# Patient Record
Sex: MALE | Race: WHITE | NOT HISPANIC OR LATINO | Employment: FULL TIME | URBAN - METROPOLITAN AREA
[De-identification: names, ages, dates, MRNs, and addresses within clinical notes are randomized per-mention and may not be internally consistent; named-entity substitution may affect disease eponyms.]

---

## 2017-03-30 ENCOUNTER — OFFICE VISIT (OUTPATIENT)
Dept: CARDIOLOGY | Facility: CLINIC | Age: 42
End: 2017-03-30

## 2017-03-30 VITALS
SYSTOLIC BLOOD PRESSURE: 202 MMHG | WEIGHT: 315 LBS | HEART RATE: 77 BPM | DIASTOLIC BLOOD PRESSURE: 110 MMHG | HEIGHT: 67 IN | BODY MASS INDEX: 49.44 KG/M2

## 2017-03-30 DIAGNOSIS — R60.0 BILATERAL EDEMA OF LOWER EXTREMITY: ICD-10-CM

## 2017-03-30 DIAGNOSIS — E66.01 MORBID OBESITY DUE TO EXCESS CALORIES (HCC): Primary | ICD-10-CM

## 2017-03-30 DIAGNOSIS — I10 ESSENTIAL HYPERTENSION: ICD-10-CM

## 2017-03-30 PROCEDURE — 99214 OFFICE O/P EST MOD 30 MIN: CPT | Performed by: INTERNAL MEDICINE

## 2017-03-30 PROCEDURE — 93000 ELECTROCARDIOGRAM COMPLETE: CPT | Performed by: INTERNAL MEDICINE

## 2017-03-30 RX ORDER — CHLORTHALIDONE 25 MG/1
25 TABLET ORAL DAILY
Qty: 30 TABLET | Refills: 5 | Status: SHIPPED | OUTPATIENT
Start: 2017-03-30 | End: 2017-04-14

## 2017-03-30 RX ORDER — CARVEDILOL 6.25 MG/1
6.25 TABLET ORAL 2 TIMES DAILY
Qty: 60 TABLET | Refills: 6 | Status: SHIPPED | OUTPATIENT
Start: 2017-03-30 | End: 2017-10-29 | Stop reason: SDUPTHER

## 2017-03-30 RX ORDER — VARENICLINE TARTRATE 1 MG/1
1 TABLET, FILM COATED ORAL 2 TIMES DAILY
Qty: 60 TABLET | Refills: 6 | Status: SHIPPED | OUTPATIENT
Start: 2017-03-30 | End: 2017-05-03 | Stop reason: SDUPTHER

## 2017-03-30 RX ORDER — VARENICLINE TARTRATE 0.5 MG/1
TABLET, FILM COATED ORAL
Qty: 11 TABLET | Refills: 0 | Status: SHIPPED | OUTPATIENT
Start: 2017-03-30 | End: 2019-08-24

## 2017-03-30 NOTE — PROGRESS NOTES
Sigifredo Lynn  1975  Date of Office Visit: 03/30/2017  Encounter Provider: Kyle Lux MD  Place of Service: [facility]      CHIEF COMPLAINT:   1. Morbid obesity.    2. Essential hypertension.   3. Lower extremity edema.   4. Tobacco abuse.         HISTORY OF PRESENT ILLNESS: I had the pleasure of seeing your patient back in followup.  As you well know, he is an obese, noncompliant patient with a medical history of essential hypertension, lower extremity edema, tobacco abuse, hyperlipidemia and sleep apnea.  He presents back for followup and has been noncompliant once again with his medications.  He is not on any antihypertensive therapies.  His blood pressure has been significantly elevated and today it is 202/110.  He reports headaches almost weekly along with visual changes at the same frequency, typically lasting about an hour to 2 hours in duration.  He feels that way when he has an extremely increased blood pressure.  He denies any chest pain or significant dyspnea; however, his activity level is very low.  Previously, he has undergone a transthoracic echocardiogram.  This was performed in 01/2015.  At that time his ejection fraction was normal and he had normal diastolic function.          Review of Systems   Constitution: Positive for malaise/fatigue. Negative for fever and weakness.   HENT: Negative for nosebleeds and sore throat.    Eyes: Negative for blurred vision and double vision.   Cardiovascular: Positive for leg swelling. Negative for chest pain, claudication, palpitations and syncope.   Respiratory: Positive for shortness of breath. Negative for cough and snoring.    Endocrine: Negative for cold intolerance, heat intolerance and polydipsia.   Skin: Negative for itching, poor wound healing and rash.   Musculoskeletal: Negative for joint pain, joint swelling, muscle weakness and myalgias.   Gastrointestinal: Negative for abdominal pain,  "melena, nausea and vomiting.   Neurological: Negative for light-headedness, loss of balance, seizures and vertigo.   Psychiatric/Behavioral: Negative for altered mental status and depression.         Past Medical History:   Diagnosis Date   • Allergy    • Dyspnea    • Hypercholesteremia    • Hypertension    • Sleep apnea    • Snoring    • TIA (transient ischemic attack)    • Urinary frequency        The following portions of the patient's history were reviewed and updated as appropriate: Social history , Family history and Surgical history     Current Outpatient Prescriptions on File Prior to Visit   Medication Sig Dispense Refill   • albuterol (PROVENTIL HFA) 108 (90 BASE) MCG/ACT inhaler Inhale 2 puffs every 4 (four) hours as needed for wheezing or shortness of air. 1 inhaler 0   • [DISCONTINUED] CARVEDILOL PO Take 6.25 mg by mouth 2 (Two) Times a Day.       No current facility-administered medications on file prior to visit.        No Known Allergies    Vitals:    03/30/17 1349   BP: (!) 202/110   Pulse: 77   Weight: (!) 415 lb (188 kg)   Height: 67\" (170.2 cm)     Physical Exam   Constitutional: He is oriented to person, place, and time. He appears well-developed and well-nourished.   Morbidly obese     HENT:   Head: Normocephalic and atraumatic.   Eyes: Conjunctivae and EOM are normal. No scleral icterus.   Neck: Normal range of motion. Neck supple. Normal carotid pulses, no hepatojugular reflux and no JVD present. Carotid bruit is not present. No tracheal deviation present. No thyromegaly present.   Cardiovascular: Normal rate and regular rhythm.  Exam reveals no gallop and no friction rub.    No murmur heard.  Pulses:       Carotid pulses are 2+ on the right side, and 2+ on the left side.       Radial pulses are 2+ on the right side, and 2+ on the left side.        Femoral pulses are 2+ on the right side, and 2+ on the left side.       Dorsalis pedis pulses are 2+ on the right side, and 2+ on the left side. "        Posterior tibial pulses are 2+ on the right side, and 2+ on the left side.   Pulmonary/Chest: Breath sounds normal. No respiratory distress. He has no decreased breath sounds. He has no wheezes. He has no rhonchi. He has no rales. He exhibits no tenderness.   Abdominal: Soft. Bowel sounds are normal. He exhibits no distension. There is no tenderness. There is no rebound.   Musculoskeletal: He exhibits edema (trace bilateral LE). He exhibits no deformity.   Neurological: He is alert and oriented to person, place, and time. He has normal strength. No sensory deficit.   Skin: No rash noted. No erythema.   Psychiatric: He has a normal mood and affect. His behavior is normal.           No components found for: CBC  No results found for: CMP  No components found for: LIPID  No results found for: BMP      ECG 12 Lead  Date/Time: 3/30/2017 2:24 PM  Performed by: CELESTINA DEL ROSARIO  Authorized by: CELESTINA DEL ROSARIO   Comparison: compared with previous ECG from 3/20/2015  Similar to previous ECG  Rhythm: sinus rhythm  Rate: normal  ST Segments: ST segments normal  T Waves: T waves normal  QRS axis: normal  Clinical impression: normal ECG               DISCUSSION/SUMMARY He is a very pleasant, yet noncompliant and obese gentleman with a medical history of essential hypertension that is poorly controlled, lower extremity edema, obstructive sleep apnea who presents back for followup.  Since our last visit, he has quit taking his antihypertensive therapy and his blood pressure today is significantly elevated with a systolic greater than 200 mmHg.  In addition to that, he has had headache along with visual changes that would be concerning for malignant hypertension.      At this time I think he is playing with fire.  He is in a dangerous situation where he is just a real setup for stroke.  I have encouraged multiple things including medication compliance along with followup and alteration in his diet.       1. Essential hypertension.  Restart carvedilol at 6.25 mg twice a day.  His heart rate is only in the low 70s and I do not think that he will tolerate much higher dose of that.  Add chlorthalidone starting at 25 mg daily.  He will likely need additional increases in that dosage.  His wife will check has blood pressure in the next couple of weeks and give us a call back to let us know where it is.  We will make changes based on that.    2. Tobacco abuse.  I have also written a prescription for Chantix to help him to stop smoking.    3. Morbid obesity.      He needs a primary care provider and I have given him a list of names.

## 2017-04-14 ENCOUNTER — TELEPHONE (OUTPATIENT)
Dept: CARDIOLOGY | Facility: CLINIC | Age: 42
End: 2017-04-14

## 2017-04-14 DIAGNOSIS — I10 ESSENTIAL HYPERTENSION: Primary | ICD-10-CM

## 2017-04-14 RX ORDER — LISINOPRIL 5 MG/1
5 TABLET ORAL DAILY
Qty: 30 TABLET | Refills: 11 | Status: SHIPPED | OUTPATIENT
Start: 2017-04-14 | End: 2017-04-19 | Stop reason: SINTOL

## 2017-04-14 NOTE — TELEPHONE ENCOUNTER
Pt called to let us know he was unable to take the chlorthalidone we started him on. In the first week he took it he was getting very dizzy. To the point he couldn't drive. He stopped it and his symptoms went away. But now his BP is 170/100. Is there anything he can take instead? Please advise.  Thanks,  Dahlia

## 2017-04-18 NOTE — TELEPHONE ENCOUNTER
Pt's wife told me that the lisinopril wasn't working either. He was getting dizzy and nauseated on the lisinopril. He also mentioned having mild hallucinations but she thought that might be the Chantix. However, they stopped when he skipped the lisinopril. His BP was 170/90 on the medication. What next an ARB?    Dahlia

## 2017-04-19 RX ORDER — LOSARTAN POTASSIUM 25 MG/1
25 TABLET ORAL DAILY
Qty: 30 TABLET | Refills: 1 | Status: SHIPPED | OUTPATIENT
Start: 2017-04-19 | End: 2017-06-19 | Stop reason: SDUPTHER

## 2017-05-03 RX ORDER — VARENICLINE TARTRATE 1 MG/1
1 TABLET, FILM COATED ORAL 2 TIMES DAILY
Qty: 60 TABLET | Refills: 6 | Status: SHIPPED | OUTPATIENT
Start: 2017-05-03 | End: 2017-06-16 | Stop reason: SDUPTHER

## 2017-06-16 RX ORDER — VARENICLINE TARTRATE 1 MG/1
1 TABLET, FILM COATED ORAL 2 TIMES DAILY
Qty: 60 TABLET | Refills: 6 | Status: SHIPPED | OUTPATIENT
Start: 2017-06-16 | End: 2019-08-24

## 2017-06-19 RX ORDER — LOSARTAN POTASSIUM 25 MG/1
TABLET ORAL
Qty: 30 TABLET | Refills: 5 | Status: SHIPPED | OUTPATIENT
Start: 2017-06-19 | End: 2017-09-11

## 2017-09-11 ENCOUNTER — OFFICE VISIT (OUTPATIENT)
Dept: RETAIL CLINIC | Facility: CLINIC | Age: 42
End: 2017-09-11

## 2017-09-11 VITALS
SYSTOLIC BLOOD PRESSURE: 182 MMHG | RESPIRATION RATE: 16 BRPM | DIASTOLIC BLOOD PRESSURE: 100 MMHG | OXYGEN SATURATION: 96 % | HEART RATE: 81 BPM | TEMPERATURE: 97 F

## 2017-09-11 DIAGNOSIS — J06.9 ACUTE URI: Primary | ICD-10-CM

## 2017-09-11 PROCEDURE — 99213 OFFICE O/P EST LOW 20 MIN: CPT | Performed by: NURSE PRACTITIONER

## 2017-09-11 RX ORDER — ALBUTEROL SULFATE 90 UG/1
2 AEROSOL, METERED RESPIRATORY (INHALATION) EVERY 4 HOURS PRN
Qty: 1 INHALER | Refills: 0 | Status: SHIPPED | OUTPATIENT
Start: 2017-09-11 | End: 2017-10-11

## 2017-09-11 RX ORDER — GUAIFENESIN 600 MG/1
1200 TABLET, EXTENDED RELEASE ORAL 2 TIMES DAILY
Qty: 20 TABLET | Refills: 0 | Status: SHIPPED | OUTPATIENT
Start: 2017-09-11 | End: 2017-09-16

## 2017-09-11 RX ORDER — BENZONATATE 200 MG/1
200 CAPSULE ORAL 3 TIMES DAILY PRN
Qty: 15 CAPSULE | Refills: 0 | Status: SHIPPED | OUTPATIENT
Start: 2017-09-11 | End: 2017-09-16

## 2017-09-11 NOTE — PATIENT INSTRUCTIONS
"Upper Respiratory Infection, Adult  Most upper respiratory infections (URIs) are a viral infection of the air passages leading to the lungs. A URI affects the nose, throat, and upper air passages. The most common type of URI is nasopharyngitis and is typically referred to as \"the common cold.\"  URIs run their course and usually go away on their own. Most of the time, a URI does not require medical attention, but sometimes a bacterial infection in the upper airways can follow a viral infection. This is called a secondary infection. Sinus and middle ear infections are common types of secondary upper respiratory infections.  Bacterial pneumonia can also complicate a URI. A URI can worsen asthma and chronic obstructive pulmonary disease (COPD). Sometimes, these complications can require emergency medical care and may be life threatening.   CAUSES  Almost all URIs are caused by viruses. A virus is a type of germ and can spread from one person to another.   RISKS FACTORS  You may be at risk for a URI if:   · You smoke.    · You have chronic heart or lung disease.  · You have a weakened defense (immune) system.    · You are very young or very old.    · You have nasal allergies or asthma.  · You work in crowded or poorly ventilated areas.  · You work in health care facilities or schools.  SIGNS AND SYMPTOMS   Symptoms typically develop 2-3 days after you come in contact with a cold virus. Most viral URIs last 7-10 days. However, viral URIs from the influenza virus (flu virus) can last 14-18 days and are typically more severe. Symptoms may include:   · Runny or stuffy (congested) nose.    · Sneezing.    · Cough.    · Sore throat.    · Headache.    · Fatigue.    · Fever.    · Loss of appetite.    · Pain in your forehead, behind your eyes, and over your cheekbones (sinus pain).  · Muscle aches.    DIAGNOSIS   Your health care provider may diagnose a URI by:  · Physical exam.  · Tests to check that your symptoms are not due to " another condition such as:  ¨ Strep throat.  ¨ Sinusitis.  ¨ Pneumonia.  ¨ Asthma.  TREATMENT   A URI goes away on its own with time. It cannot be cured with medicines, but medicines may be prescribed or recommended to relieve symptoms. Medicines may help:  · Reduce your fever.  · Reduce your cough.  · Relieve nasal congestion.  HOME CARE INSTRUCTIONS   · Take medicines only as directed by your health care provider.    · Gargle warm saltwater or take cough drops to comfort your throat as directed by your health care provider.  · Use a warm mist humidifier or inhale steam from a shower to increase air moisture. This may make it easier to breathe.  · Drink enough fluid to keep your urine clear or pale yellow.    · Eat soups and other clear broths and maintain good nutrition.    · Rest as needed.    · Return to work when your temperature has returned to normal or as your health care provider advises. You may need to stay home longer to avoid infecting others. You can also use a face mask and careful hand washing to prevent spread of the virus.  · Increase the usage of your inhaler if you have asthma.    · Do not use any tobacco products, including cigarettes, chewing tobacco, or electronic cigarettes. If you need help quitting, ask your health care provider.  PREVENTION   The best way to protect yourself from getting a cold is to practice good hygiene.   · Avoid oral or hand contact with people with cold symptoms.    · Wash your hands often if contact occurs.    There is no clear evidence that vitamin C, vitamin E, echinacea, or exercise reduces the chance of developing a cold. However, it is always recommended to get plenty of rest, exercise, and practice good nutrition.   SEEK MEDICAL CARE IF:   · You are getting worse rather than better.    · Your symptoms are not controlled by medicine.    · You have chills.  · You have worsening shortness of breath.  · You have brown or red mucus.  · You have yellow or brown nasal  discharge.  · You have pain in your face, especially when you bend forward.  · You have a fever.  · You have swollen neck glands.  · You have pain while swallowing.  · You have white areas in the back of your throat.  SEEK IMMEDIATE MEDICAL CARE IF:   · You have severe or persistent:    Headache.    Ear pain.    Sinus pain.    Chest pain.  · You have chronic lung disease and any of the following:    Wheezing.    Prolonged cough.    Coughing up blood.    A change in your usual mucus.  · You have a stiff neck.  · You have changes in your:    Vision.    Hearing.    Thinking.    Mood.  MAKE SURE YOU:   · Understand these instructions.  · Will watch your condition.  · Will get help right away if you are not doing well or get worse.     This information is not intended to replace advice given to you by your health care provider. Make sure you discuss any questions you have with your health care provider.     Document Released: 06/13/2002 Document Revised: 05/03/2016 Document Reviewed: 03/25/2015  [employer] Interactive Patient Education ©2017 [employer]

## 2017-09-11 NOTE — PROGRESS NOTES
Subjective   Patient ID: Sigifredo Lynn is a 42 y.o. male presents with   Chief Complaint   Patient presents with   • Bronchitis     today       Bronchitis   This is a new problem. The current episode started today. The problem has been gradually worsening. Associated symptoms include congestion, coughing, diaphoresis and fatigue. Pertinent negatives include no chills, fever, headaches or sore throat. Nothing aggravates the symptoms. He has tried nothing for the symptoms. The treatment provided no relief.   coughing hourly worse at night    No Known Allergies    The following portions of the patient's history were reviewed and updated as appropriate: allergies, current medications, past family history, past medical history, past social history, past surgical history and problem list.      Review of Systems   Constitutional: Positive for diaphoresis and fatigue. Negative for chills and fever.   HENT: Positive for congestion and rhinorrhea. Negative for ear pain, postnasal drip, sinus pressure and sore throat.    Eyes: Negative.    Respiratory: Positive for cough, shortness of breath and wheezing. Negative for chest tightness.    Cardiovascular: Negative.    Gastrointestinal: Negative.    Neurological: Negative for headaches.       Objective     Vitals:    09/11/17 1414   BP: (!) 182/100   Pulse: 81   Resp: 16   Temp: 97 °F (36.1 °C)   SpO2: 96%         Physical Exam   Constitutional: He is oriented to person, place, and time. He appears well-developed and well-nourished. He does not appear ill. No distress.   HENT:   Head: Normocephalic.   Right Ear: Hearing, tympanic membrane, external ear and ear canal normal.   Left Ear: Hearing, tympanic membrane, external ear and ear canal normal.   Nose: Mucosal edema present. No rhinorrhea or sinus tenderness.   Mouth/Throat: Mucous membranes are normal. Posterior oropharyngeal erythema present. Tonsils are 2+ on the right. Tonsils are 2+ on the left. No tonsillar exudate.    Eyes: Conjunctivae are normal.   Sclera white.   Neck: No tracheal deviation present.   Cardiovascular: Normal rate, regular rhythm, S1 normal, S2 normal and normal heart sounds.    Pulmonary/Chest: Effort normal. No accessory muscle usage. No respiratory distress. He has decreased breath sounds in the right middle field, the right lower field, the left middle field and the left lower field. He has no wheezes. He has no rhonchi. He has no rales.   Abdominal: Soft. Bowel sounds are normal. There is no tenderness.   Lymphadenopathy:     He has no cervical adenopathy.   Neurological: He is alert and oriented to person, place, and time.   Skin: Skin is warm and dry.   Vitals reviewed.        Sigifredo was seen today for bronchitis.    Diagnoses and all orders for this visit:    Acute URI  -     albuterol (PROVENTIL HFA) 108 (90 Base) MCG/ACT inhaler; Inhale 2 puffs Every 4 (Four) Hours As Needed for Wheezing or Shortness of Air for up to 30 days.  -     benzonatate (TESSALON) 200 MG capsule; Take 1 capsule by mouth 3 (Three) Times a Day As Needed for Cough for up to 5 days.  -     guaiFENesin (MUCINEX) 600 MG 12 hr tablet; Take 2 tablets by mouth 2 (Two) Times a Day for 5 days.        Referred to ER for high blood pressure. Follow-up with Primary Care Physician in 48-72 hours if these symptoms worsen or fail to improve as anticipated. Patient verbalizes understanding.

## 2017-10-30 RX ORDER — CARVEDILOL 6.25 MG/1
TABLET ORAL
Qty: 60 TABLET | Refills: 5 | Status: SHIPPED | OUTPATIENT
Start: 2017-10-30 | End: 2019-08-24

## 2019-04-04 ENCOUNTER — OFFICE VISIT (OUTPATIENT)
Dept: RETAIL CLINIC | Facility: CLINIC | Age: 44
End: 2019-04-04

## 2019-04-04 VITALS
HEART RATE: 81 BPM | SYSTOLIC BLOOD PRESSURE: 150 MMHG | OXYGEN SATURATION: 96 % | RESPIRATION RATE: 16 BRPM | TEMPERATURE: 97.8 F | DIASTOLIC BLOOD PRESSURE: 90 MMHG

## 2019-04-04 DIAGNOSIS — J40 BRONCHITIS: Primary | ICD-10-CM

## 2019-04-04 DIAGNOSIS — J98.8 VIRAL RESPIRATORY ILLNESS: ICD-10-CM

## 2019-04-04 DIAGNOSIS — B97.89 VIRAL RESPIRATORY ILLNESS: ICD-10-CM

## 2019-04-04 LAB
EXPIRATION DATE: NORMAL
FLUAV AG NPH QL: NEGATIVE
FLUBV AG NPH QL: NEGATIVE
INTERNAL CONTROL: NORMAL
Lab: NORMAL

## 2019-04-04 PROCEDURE — 87804 INFLUENZA ASSAY W/OPTIC: CPT | Performed by: NURSE PRACTITIONER

## 2019-04-04 PROCEDURE — 99213 OFFICE O/P EST LOW 20 MIN: CPT | Performed by: NURSE PRACTITIONER

## 2019-04-04 RX ORDER — GUAIFENESIN 1200 MG/1
1 TABLET, EXTENDED RELEASE ORAL 2 TIMES DAILY
Qty: 10 EACH | Refills: 0 | Status: SHIPPED | OUTPATIENT
Start: 2019-04-04 | End: 2019-04-09

## 2019-04-04 RX ORDER — CETIRIZINE HYDROCHLORIDE 10 MG/1
10 TABLET ORAL DAILY
Qty: 14 TABLET | Refills: 0 | Status: SHIPPED | OUTPATIENT
Start: 2019-04-04 | End: 2019-04-18

## 2019-04-04 RX ORDER — ALBUTEROL SULFATE 90 UG/1
2 AEROSOL, METERED RESPIRATORY (INHALATION) EVERY 4 HOURS PRN
Qty: 1 INHALER | Refills: 0 | Status: SHIPPED | OUTPATIENT
Start: 2019-04-04 | End: 2019-05-04

## 2019-04-04 RX ORDER — FLUTICASONE PROPIONATE 50 MCG
1 SPRAY, SUSPENSION (ML) NASAL EVERY 12 HOURS
Qty: 1 BOTTLE | Refills: 0 | Status: SHIPPED | OUTPATIENT
Start: 2019-04-04 | End: 2019-04-11

## 2019-04-04 RX ORDER — BENZONATATE 200 MG/1
200 CAPSULE ORAL 3 TIMES DAILY PRN
Qty: 15 CAPSULE | Refills: 0 | Status: SHIPPED | OUTPATIENT
Start: 2019-04-04 | End: 2019-04-09

## 2019-04-04 NOTE — PATIENT INSTRUCTIONS
Acute Bronchitis, Adult  Acute bronchitis is sudden (acute) swelling of the air tubes (bronchi) in the lungs. Acute bronchitis causes these tubes to fill with mucus, which can make it hard to breathe. It can also cause coughing or wheezing.  In adults, acute bronchitis usually goes away within 2 weeks. A cough caused by bronchitis may last up to 3 weeks. Smoking, allergies, and asthma can make the condition worse. Repeated episodes of bronchitis may cause further lung problems, such as chronic obstructive pulmonary disease (COPD).  What are the causes?  This condition can be caused by germs and by substances that irritate the lungs, including:  · Cold and flu viruses. This condition is most often caused by the same virus that causes a cold.  · Bacteria.  · Exposure to tobacco smoke, dust, fumes, and air pollution.    What increases the risk?  This condition is more likely to develop in people who:  · Have close contact with someone with acute bronchitis.  · Are exposed to lung irritants, such as tobacco smoke, dust, fumes, and vapors.  · Have a weak immune system.  · Have a respiratory condition such as asthma.    What are the signs or symptoms?  Symptoms of this condition include:  · A cough.  · Coughing up clear, yellow, or green mucus.  · Wheezing.  · Chest congestion.  · Shortness of breath.  · A fever.  · Body aches.  · Chills.  · A sore throat.    How is this diagnosed?  This condition is usually diagnosed with a physical exam. During the exam, your health care provider may order tests, such as chest X-rays, to rule out other conditions. He or she may also:  · Test a sample of your mucus for bacterial infection.  · Check the level of oxygen in your blood. This is done to check for pneumonia.  · Do a chest X-ray or lung function testing to rule out pneumonia and other conditions.  · Perform blood tests.    Your health care provider will also ask about your symptoms and medical history.  How is this  treated?  Most cases of acute bronchitis clear up over time without treatment. Your health care provider may recommend:  · Drinking more fluids. Drinking more makes your mucus thinner, which may make it easier to breathe.  · Taking a medicine for a fever or cough.  · Taking an antibiotic medicine.  · Using an inhaler to help improve shortness of breath and to control a cough.  · Using a cool mist vaporizer or humidifier to make it easier to breathe.    Follow these instructions at home:  Medicines  · Take over-the-counter and prescription medicines only as told by your health care provider.  · If you were prescribed an antibiotic, take it as told by your health care provider. Do not stop taking the antibiotic even if you start to feel better.  General instructions  · Get plenty of rest.  · Drink enough fluids to keep your urine pale yellow.  · Avoid smoking and secondhand smoke. Exposure to cigarette smoke or irritating chemicals will make bronchitis worse. If you smoke and you need help quitting, ask your health care provider. Quitting smoking will help your lungs heal faster.  · Use an inhaler, cool mist vaporizer, or humidifier as told by your health care provider.  · Keep all follow-up visits as told by your health care provider. This is important.  How is this prevented?  To lower your risk of getting this condition again:  · Wash your hands often with soap and water. If soap and water are not available, use hand .  · Avoid contact with people who have cold symptoms.  · Try not to touch your hands to your mouth, nose, or eyes.  · Make sure to get the flu shot every year.    Contact a health care provider if:  · Your symptoms do not improve in 2 weeks of treatment.  Get help right away if:  · You cough up blood.  · You have chest pain.  · You have severe shortness of breath.  · You become dehydrated.  · You faint or keep feeling like you are going to faint.  · You keep vomiting.  · You have a severe  headache.  · Your fever or chills gets worse.  This information is not intended to replace advice given to you by your health care provider. Make sure you discuss any questions you have with your health care provider.  Document Released: 01/25/2006 Document Revised: 08/01/2018 Document Reviewed: 06/07/2017  [employer] Interactive Patient Education © 2019 [employer]  Viral Respiratory Infection  A respiratory infection is an illness that affects part of the respiratory system, such as the lungs, nose, or throat. Most respiratory infections are caused by either viruses or bacteria. A respiratory infection that is caused by a virus is called a viral respiratory infection.  Common types of viral respiratory infections include:  · A cold.  · The flu (influenza).  · A respiratory syncytial virus (RSV) infection.    How do I know if I have a viral respiratory infection?  Most viral respiratory infections cause:  · A stuffy or runny nose.  · Yellow or green nasal discharge.  · A cough.  · Sneezing.  · Fatigue.  · Achy muscles.  · A sore throat.  · Sweating or chills.  · A fever.  · A headache.    How are viral respiratory infections treated?  If influenza is diagnosed early, it may be treated with an antiviral medicine that shortens the length of time a person has symptoms. Symptoms of viral respiratory infections may be treated with over-the-counter and prescription medicines, such as:  · Expectorants. These make it easier to cough up mucus.  · Decongestant nasal sprays.    Health care providers do not prescribe antibiotic medicines for viral infections. This is because antibiotics are designed to kill bacteria. They have no effect on viruses.  How do I know if I should stay home from work or school?  To avoid exposing others to your respiratory infection, stay home if you have:  · A fever.  · A persistent cough.  · A sore throat.  · A runny nose.  · Sneezing.  · Muscles  aches.  · Headaches.  · Fatigue.  · Weakness.  · Chills.  · Sweating.  · Nausea.    Follow these instructions at home:  · Rest as much as possible.  · Take over-the-counter and prescription medicines only as told by your health care provider.  · Drink enough fluid to keep your urine clear or pale yellow. This helps prevent dehydration and helps loosen up mucus.  · Gargle with a salt-water mixture 3-4 times per day or as needed. To make a salt-water mixture, completely dissolve ½-1 tsp of salt in 1 cup of warm water.  · Use nose drops made from salt water to ease congestion and soften raw skin around your nose.  · Do not drink alcohol.  · Do not use tobacco products, including cigarettes, chewing tobacco, and e-cigarettes. If you need help quitting, ask your health care provider.  Contact a health care provider if:  · Your symptoms last for 10 days or longer.  · Your symptoms get worse over time.  · You have a fever.  · You have severe sinus pain in your face or forehead.  · The glands in your jaw or neck become very swollen.  Get help right away if:  · You feel pain or pressure in your chest.  · You have shortness of breath.  · You faint or feel like you will faint.  · You have severe and persistent vomiting.  · You feel confused or disoriented.  This information is not intended to replace advice given to you by your health care provider. Make sure you discuss any questions you have with your health care provider.  Document Released: 09/27/2006 Document Revised: 05/25/2017 Document Reviewed: 05/25/2016  [employer] Interactive Patient Education © 2019 [employer]

## 2019-04-04 NOTE — PROGRESS NOTES
Subjective   Patient ID: Sigifredo Lynn is a 43 y.o. male presents with   Chief Complaint   Patient presents with   • Flu Symptoms     Pt states his symptom started two days ago. He states it got worse yesterday. He states he is drained and has body aches and chills. He states he has a headache. He states he's had a runny and stuffy nose. He state he has congestion.        Flu Symptoms   This is a new problem. The current episode started in the past 7 days. The problem has been gradually worsening. Associated symptoms include chills, congestion, coughing (dry x 4d), diaphoresis, fatigue, a fever (low grade), headaches and nausea. Pertinent negatives include no sore throat or vomiting. Nothing aggravates the symptoms. He has tried acetaminophen (nyquil) for the symptoms. The treatment provided mild relief.       No Known Allergies    The following portions of the patient's history were reviewed and updated as appropriate: allergies, current medications, past family history, past medical history, past social history, past surgical history and problem list.      Review of Systems   Constitutional: Positive for chills, diaphoresis, fatigue and fever (low grade).   HENT: Positive for congestion, postnasal drip, rhinorrhea, sinus pressure and sneezing. Negative for ear pain and sore throat.    Respiratory: Positive for cough (dry x 4d), chest tightness and wheezing. Negative for shortness of breath.    Cardiovascular: Negative.    Gastrointestinal: Positive for nausea. Negative for diarrhea and vomiting.   Musculoskeletal:        Bodyaches   Neurological: Positive for headaches.       Objective     Vitals:    04/04/19 1016   BP: 150/90   Pulse: 81   Resp: 16   Temp: 97.8 °F (36.6 °C)   SpO2: 96%         Physical Exam   Constitutional: He is oriented to person, place, and time. He appears well-developed and well-nourished. He does not appear ill. No distress.   HENT:   Head: Normocephalic.   Right Ear: Hearing, tympanic  membrane, external ear and ear canal normal.   Left Ear: Hearing, external ear and ear canal normal.   Nose: Mucosal edema, rhinorrhea and sinus tenderness present. Right sinus exhibits maxillary sinus tenderness and frontal sinus tenderness. Left sinus exhibits maxillary sinus tenderness and frontal sinus tenderness.   Mouth/Throat: Mucous membranes are normal. Posterior oropharyngeal erythema present. No tonsillar exudate.   Left tm not visualized due to cerumen   Eyes: Conjunctivae are normal.   Sclera white.   Neck: No tracheal deviation present.   Cardiovascular: Normal rate, regular rhythm, S1 normal, S2 normal and normal heart sounds.   Pulmonary/Chest: Effort normal. No accessory muscle usage. No respiratory distress. He has no decreased breath sounds. He has wheezes (clears a lot after cough) in the right upper field, the right middle field, the right lower field, the left upper field, the left middle field and the left lower field. He has no rhonchi. He has no rales.   Abdominal: Soft. Bowel sounds are normal. There is no tenderness.   Lymphadenopathy:     He has no cervical adenopathy.   Neurological: He is alert and oriented to person, place, and time.   Skin: Skin is warm and dry.   Vitals reviewed.    Lab Results   Component Value Date    RAPFLUA Negative 04/04/2019    RAPFLUB Negative 04/04/2019         Sigifredo was seen today for flu symptoms.    Diagnoses and all orders for this visit:    Bronchitis  -     POC Influenza A / B  -     albuterol sulfate  (90 Base) MCG/ACT inhaler; Inhale 2 puffs Every 4 (Four) Hours As Needed for Wheezing or Shortness of Air for up to 30 days.  -     PredniSONE 5 MG tablet therapy pack dosepak; Take as directed on package instructions.  -     guaiFENesin ER (MUCINEX MAXIMUM STRENGTH) 1200 MG tablet sustained-release 12 hour; Take 1 tablet by mouth 2 (Two) Times a Day for 5 days.  -     benzonatate (TESSALON) 200 MG capsule; Take 1 capsule by mouth 3 (Three) Times  a Day As Needed for Cough for up to 5 days.    Viral respiratory illness  -     POC Influenza A / B  -     fluticasone (FLONASE) 50 MCG/ACT nasal spray; 1 spray into the nostril(s) as directed by provider Every 12 (Twelve) Hours for 7 days.  -     cetirizine (zyrTEC) 10 MG tablet; Take 1 tablet by mouth Daily for 14 days.        Patient understands possible side effects of all medications ordered. Follow-up with Primary Care Physician in 48-72 hours if these symptoms worsen or fail to improve as anticipated. Patient verbalizes understanding.    Acute Bronchitis, Adult  Acute bronchitis is sudden (acute) swelling of the air tubes (bronchi) in the lungs. Acute bronchitis causes these tubes to fill with mucus, which can make it hard to breathe. It can also cause coughing or wheezing.  In adults, acute bronchitis usually goes away within 2 weeks. A cough caused by bronchitis may last up to 3 weeks. Smoking, allergies, and asthma can make the condition worse. Repeated episodes of bronchitis may cause further lung problems, such as chronic obstructive pulmonary disease (COPD).  What are the causes?  This condition can be caused by germs and by substances that irritate the lungs, including:  · Cold and flu viruses. This condition is most often caused by the same virus that causes a cold.  · Bacteria.  · Exposure to tobacco smoke, dust, fumes, and air pollution.    What increases the risk?  This condition is more likely to develop in people who:  · Have close contact with someone with acute bronchitis.  · Are exposed to lung irritants, such as tobacco smoke, dust, fumes, and vapors.  · Have a weak immune system.  · Have a respiratory condition such as asthma.    What are the signs or symptoms?  Symptoms of this condition include:  · A cough.  · Coughing up clear, yellow, or green mucus.  · Wheezing.  · Chest congestion.  · Shortness of breath.  · A fever.  · Body aches.  · Chills.  · A sore throat.    How is this  diagnosed?  This condition is usually diagnosed with a physical exam. During the exam, your health care provider may order tests, such as chest X-rays, to rule out other conditions. He or she may also:  · Test a sample of your mucus for bacterial infection.  · Check the level of oxygen in your blood. This is done to check for pneumonia.  · Do a chest X-ray or lung function testing to rule out pneumonia and other conditions.  · Perform blood tests.    Your health care provider will also ask about your symptoms and medical history.  How is this treated?  Most cases of acute bronchitis clear up over time without treatment. Your health care provider may recommend:  · Drinking more fluids. Drinking more makes your mucus thinner, which may make it easier to breathe.  · Taking a medicine for a fever or cough.  · Taking an antibiotic medicine.  · Using an inhaler to help improve shortness of breath and to control a cough.  · Using a cool mist vaporizer or humidifier to make it easier to breathe.    Follow these instructions at home:  Medicines  · Take over-the-counter and prescription medicines only as told by your health care provider.  · If you were prescribed an antibiotic, take it as told by your health care provider. Do not stop taking the antibiotic even if you start to feel better.  General instructions  · Get plenty of rest.  · Drink enough fluids to keep your urine pale yellow.  · Avoid smoking and secondhand smoke. Exposure to cigarette smoke or irritating chemicals will make bronchitis worse. If you smoke and you need help quitting, ask your health care provider. Quitting smoking will help your lungs heal faster.  · Use an inhaler, cool mist vaporizer, or humidifier as told by your health care provider.  · Keep all follow-up visits as told by your health care provider. This is important.  How is this prevented?  To lower your risk of getting this condition again:  · Wash your hands often with soap and water. If  soap and water are not available, use hand .  · Avoid contact with people who have cold symptoms.  · Try not to touch your hands to your mouth, nose, or eyes.  · Make sure to get the flu shot every year.    Contact a health care provider if:  · Your symptoms do not improve in 2 weeks of treatment.  Get help right away if:  · You cough up blood.  · You have chest pain.  · You have severe shortness of breath.  · You become dehydrated.  · You faint or keep feeling like you are going to faint.  · You keep vomiting.  · You have a severe headache.  · Your fever or chills gets worse.  This information is not intended to replace advice given to you by your health care provider. Make sure you discuss any questions you have with your health care provider.  Document Released: 01/25/2006 Document Revised: 08/01/2018 Document Reviewed: 06/07/2017  [employer] Interactive Patient Education © 2019 [employer]  Viral Respiratory Infection  A respiratory infection is an illness that affects part of the respiratory system, such as the lungs, nose, or throat. Most respiratory infections are caused by either viruses or bacteria. A respiratory infection that is caused by a virus is called a viral respiratory infection.  Common types of viral respiratory infections include:  · A cold.  · The flu (influenza).  · A respiratory syncytial virus (RSV) infection.    How do I know if I have a viral respiratory infection?  Most viral respiratory infections cause:  · A stuffy or runny nose.  · Yellow or green nasal discharge.  · A cough.  · Sneezing.  · Fatigue.  · Achy muscles.  · A sore throat.  · Sweating or chills.  · A fever.  · A headache.    How are viral respiratory infections treated?  If influenza is diagnosed early, it may be treated with an antiviral medicine that shortens the length of time a person has symptoms. Symptoms of viral respiratory infections may be treated with over-the-counter and prescription medicines, such  as:  · Expectorants. These make it easier to cough up mucus.  · Decongestant nasal sprays.    Health care providers do not prescribe antibiotic medicines for viral infections. This is because antibiotics are designed to kill bacteria. They have no effect on viruses.  How do I know if I should stay home from work or school?  To avoid exposing others to your respiratory infection, stay home if you have:  · A fever.  · A persistent cough.  · A sore throat.  · A runny nose.  · Sneezing.  · Muscles aches.  · Headaches.  · Fatigue.  · Weakness.  · Chills.  · Sweating.  · Nausea.    Follow these instructions at home:  · Rest as much as possible.  · Take over-the-counter and prescription medicines only as told by your health care provider.  · Drink enough fluid to keep your urine clear or pale yellow. This helps prevent dehydration and helps loosen up mucus.  · Gargle with a salt-water mixture 3-4 times per day or as needed. To make a salt-water mixture, completely dissolve ½-1 tsp of salt in 1 cup of warm water.  · Use nose drops made from salt water to ease congestion and soften raw skin around your nose.  · Do not drink alcohol.  · Do not use tobacco products, including cigarettes, chewing tobacco, and e-cigarettes. If you need help quitting, ask your health care provider.  Contact a health care provider if:  · Your symptoms last for 10 days or longer.  · Your symptoms get worse over time.  · You have a fever.  · You have severe sinus pain in your face or forehead.  · The glands in your jaw or neck become very swollen.  Get help right away if:  · You feel pain or pressure in your chest.  · You have shortness of breath.  · You faint or feel like you will faint.  · You have severe and persistent vomiting.  · You feel confused or disoriented.  This information is not intended to replace advice given to you by your health care provider. Make sure you discuss any questions you have with your health care provider.  Document  Released: 09/27/2006 Document Revised: 05/25/2017 Document Reviewed: 05/25/2016  [employer] Interactive Patient Education © 2019 [employer]

## 2019-08-24 ENCOUNTER — OFFICE VISIT (OUTPATIENT)
Dept: RETAIL CLINIC | Facility: CLINIC | Age: 44
End: 2019-08-24

## 2019-08-24 VITALS
TEMPERATURE: 98 F | DIASTOLIC BLOOD PRESSURE: 104 MMHG | RESPIRATION RATE: 16 BRPM | HEART RATE: 73 BPM | OXYGEN SATURATION: 94 % | SYSTOLIC BLOOD PRESSURE: 168 MMHG

## 2019-08-24 DIAGNOSIS — J01.01 ACUTE RECURRENT MAXILLARY SINUSITIS: Primary | ICD-10-CM

## 2019-08-24 DIAGNOSIS — J20.8 ACUTE BRONCHITIS DUE TO OTHER SPECIFIED ORGANISMS: ICD-10-CM

## 2019-08-24 DIAGNOSIS — I10 ESSENTIAL HYPERTENSION: ICD-10-CM

## 2019-08-24 PROCEDURE — 99214 OFFICE O/P EST MOD 30 MIN: CPT | Performed by: NURSE PRACTITIONER

## 2019-08-24 RX ORDER — GUAIFENESIN 600 MG/1
1200 TABLET, EXTENDED RELEASE ORAL 2 TIMES DAILY
Qty: 40 TABLET | Refills: 0 | Status: SHIPPED | OUTPATIENT
Start: 2019-08-24

## 2019-08-24 RX ORDER — ALBUTEROL SULFATE 90 UG/1
2 AEROSOL, METERED RESPIRATORY (INHALATION) EVERY 4 HOURS PRN
Qty: 1 INHALER | Refills: 1 | Status: SHIPPED | OUTPATIENT
Start: 2019-08-24

## 2019-08-24 RX ORDER — AMOXICILLIN AND CLAVULANATE POTASSIUM 875; 125 MG/1; MG/1
1 TABLET, FILM COATED ORAL 2 TIMES DAILY
Qty: 20 TABLET | Refills: 0 | Status: SHIPPED | OUTPATIENT
Start: 2019-08-24 | End: 2019-09-03

## 2019-08-24 RX ORDER — PREDNISONE 10 MG/1
TABLET ORAL
Qty: 1 EACH | Refills: 0 | Status: SHIPPED | OUTPATIENT
Start: 2019-08-24

## 2019-08-24 RX ORDER — BENZONATATE 100 MG/1
200 CAPSULE ORAL 3 TIMES DAILY PRN
Qty: 42 CAPSULE | Refills: 0 | Status: SHIPPED | OUTPATIENT
Start: 2019-08-24

## 2019-08-24 NOTE — PROGRESS NOTES
"[facility]    CC:   Chief Complaint   Patient presents with   • Cough   • Wheezing   • URI     HPI   44 YOM presents c/o 1 week ago \"allergy\" type symptoms, then 3 days ago, sudden change with severe head/nasal/chest congestion/nasal mucus-copious amounts, whitish, thick/  Coughing nonproductive but +soa/wheezing/severe sinus pain ears/eyes/face  Sensation of fever,+ chills  Lethargic, much decreased appetite, drinking water  Sore throat previously, now gone  +nausea/vomiting/diarrhea  X 2 days earlier this week  +chest/rib soreness with coughing    H/O bronchitis, feels same  No hospitalization for this  +kids sick back at school recently, similar symptoms but theirs is gone, his is now worse  Taking tylenol/nyquil/dayquil, no relief    Unable to smoke his usual amount.  Denies ear pain/dizziness/lightheadedness.    Review of Systems: Please see the above history of present illness for pertinent positives and negatives. The remainder of the patient's systems have been reviewed and are negative.     Past Medical History:   Diagnosis Date   • Allergy    • Dyspnea    • Hypercholesteremia    • Hypertension    • Sleep apnea    • Snoring    • TIA (transient ischemic attack)    • Urinary frequency        Past Surgical History:   Procedure Laterality Date   • HERNIA REPAIR  01/1999       Social History     Tobacco Use   • Smoking status: Current Every Day Smoker     Packs/day: 1.00     Start date: 1995   • Smokeless tobacco: Never Used   • Tobacco comment: not exposed to second hand smoke   Substance Use Topics   • Alcohol use: No     Comment: caffeine use   • Drug use: No       Current Outpatient Medications:  NONE    No Known Allergies    OBJECTIVE:    BP (!) 168/104   Pulse 73   Temp 98 °F (36.7 °C) (Oral)   Resp 16   SpO2 94%     Lab Results (last 24 hours)     ** No results found for the last 24 hours. **          General Appearance:    Alert, cooperative, no distress, appears stated age, morbidly obese "   Head:    Normocephalic, without obvious abnormality, atraumatic   Eyes:    PERRL, conjunctiva/corneas clear, EOM's intact, fundi     benign, both eyes   Ears:    Small bilateral serous effusions, bilateral canals unremarkable   Nose:   Bilaterally inflamed turbinates with whitish colored mucus noted. +bilateral maxillary sinus tenderness with palpation   Throat:   Lips, mucosa, and tongue normal; teeth and gums normal  Tonsils with erythema, no exudates.   Neck:   Supple, symmetrical, trachea midline, no adenopathy;            Lungs:     Expiratory wheezes all fields with scattered rhonchi, diminished bases bilaterally, no rales   Chest Wall:    No tenderness or deformity    Heart:    Regular rate and rhythm, S1 and S2 normal, no murmur, rub    or gallop                                       ASSESSMENT/PLAN    1. Acute recurrent maxillary sinusitis    - amoxicillin-clavulanate (AUGMENTIN) 875-125 MG per tablet; Take 1 tablet by mouth 2 (Two) Times a Day for 10 days.  Dispense: 20 tablet; Refill: 0  - guaiFENesin (MUCINEX) 600 MG 12 hr tablet; Take 2 tablets by mouth 2 (Two) Times a Day.  Dispense: 40 tablet; Refill: 0    2. Acute bronchitis due to other specified organisms    - predniSONE (DELTASONE) 10 MG (21) tablet pack; Use as directed on package  Dispense: 1 each; Refill: 0  - albuterol sulfate  (90 Base) MCG/ACT inhaler; Inhale 2 puffs Every 4 (Four) Hours As Needed for Wheezing.  Dispense: 1 inhaler; Refill: 1  - benzonatate (TESSALON PERLES) 100 MG capsule; Take 2 capsules by mouth 3 (Three) Times a Day As Needed for Cough.  Dispense: 42 capsule; Refill: 0    3. Hypertension:   Counseled regarding need to resume his coreg, he noted he was aware and is monitoring his BP at home. Quit medication after losing a lot of weight. Will continue to monitor and resume.     Sigifredo Lynn had no medications administered during this visit.      AVS and information sheet given to patient, discussed in detail,  questions answered